# Patient Record
Sex: FEMALE | Race: WHITE | NOT HISPANIC OR LATINO | Employment: UNEMPLOYED | URBAN - METROPOLITAN AREA
[De-identification: names, ages, dates, MRNs, and addresses within clinical notes are randomized per-mention and may not be internally consistent; named-entity substitution may affect disease eponyms.]

---

## 2024-07-15 ENCOUNTER — TELEPHONE (OUTPATIENT)
Dept: OBSTETRICS AND GYNECOLOGY | Facility: CLINIC | Age: 24
End: 2024-07-15

## 2024-07-15 NOTE — TELEPHONE ENCOUNTER
----- Message from Taylor Ian sent at 7/12/2024  1:20 PM CDT -----  Type: NOB Confirmation     Who Called: Marianna Castillo    Symptoms :  NOB   Missed cycle: yes   Home tested: yes ( 2+)   Would the patient rather a call back or a response via [facility]? CB   Best call back number: [phone]    Additional Info: needs NOB confirmation appt please call

## 2024-07-15 NOTE — TELEPHONE ENCOUNTER
Spoke with patient and got her NOB information. Told her we would call her back to get her scheduled. Patient verbalized understanding.

## 2024-07-17 ENCOUNTER — PATIENT MESSAGE (OUTPATIENT)
Dept: OBSTETRICS AND GYNECOLOGY | Facility: CLINIC | Age: 24
End: 2024-07-17

## 2024-07-17 ENCOUNTER — TELEPHONE (OUTPATIENT)
Dept: OBSTETRICS AND GYNECOLOGY | Facility: CLINIC | Age: 24
End: 2024-07-17

## 2024-07-17 DIAGNOSIS — Z34.91 ENCOUNTER FOR PREGNANCY RELATED EXAMINATION IN FIRST TRIMESTER: Primary | ICD-10-CM

## 2024-07-17 NOTE — TELEPHONE ENCOUNTER
----- Message from Carlene Hyman sent at 7/17/2024  1:54 PM CDT -----  Contact: self  Type:  Patient Returning Call    Who Called:Marianna Castillo  Who Left Message for Patient:Ajay  Does the patient know what this is regarding?:US  Would the patient rather a call back or a response via [facility]? Call back  Best Call Back Number:[phone]  Additional Information: n/a

## 2024-08-13 ENCOUNTER — PROCEDURE VISIT (OUTPATIENT)
Dept: OBSTETRICS AND GYNECOLOGY | Facility: CLINIC | Age: 24
End: 2024-08-13
Payer: COMMERCIAL

## 2024-08-13 DIAGNOSIS — Z34.91 ENCOUNTER FOR PREGNANCY RELATED EXAMINATION IN FIRST TRIMESTER: ICD-10-CM

## 2024-08-13 DIAGNOSIS — Z34.91 ENCOUNTER FOR PREGNANCY RELATED EXAMINATION IN FIRST TRIMESTER: Primary | ICD-10-CM

## 2024-08-14 LAB
ABS NRBC COUNT: 0 X 10 3/UL (ref 0–0.01)
ABSOLUTE BASOPHIL: 0.01 X 10 3/UL (ref 0–0.22)
ABSOLUTE EOSINOPHIL: 0.05 X 10 3/UL (ref 0.04–0.54)
ABSOLUTE IMMATURE GRAN: 0.02 X 10 3/UL (ref 0–0.04)
ABSOLUTE LYMPHOCYTE: 1.9 X 10 3/UL (ref 0.86–4.75)
ABSOLUTE MONOCYTE: 0.44 X 10 3/UL (ref 0.22–1.08)
ANTIBODY SCREEN: NEGATIVE
BASOPHILS NFR BLD: 0.1 % (ref 0.2–1.2)
BLOOD GROUPING: NORMAL
BLOOD TYPE (D): POSITIVE
EOSINOPHIL NFR BLD: 0.6 % (ref 0.7–7)
HBV SURFACE AG SERPL QL IA: NONREACTIVE
HCT VFR BLD AUTO: 39.2 % (ref 37–47)
HCV IGG SERPL QL IA: NONREACTIVE
HGB BLD-MCNC: 13.4 G/DL (ref 12–16)
HIV 1+2 AB+HIV1 P24 AG SERPL QL IA: NONREACTIVE
IMMATURE GRANULOCYTES: 0.2 % (ref 0–0.5)
LYMPHOCYTES NFR BLD: 23.2 % (ref 19.3–53.1)
MCH RBC QN AUTO: 29.8 PG (ref 27–32)
MCHC RBC AUTO-ENTMCNC: 34.2 G/DL (ref 32–36)
MCV RBC AUTO: 87.1 FL (ref 82–100)
MONOCYTES NFR BLD: 5.4 % (ref 4.7–12.5)
NEUTROPHILS # BLD AUTO: 5.78 X 10 3/UL (ref 2.15–7.56)
NEUTROPHILS NFR BLD: 70.5 % (ref 34–71.1)
NUCLEATED RED BLOOD CELLS: 0 /100 WBC (ref 0–0.2)
PLATELET # BLD AUTO: 249 X 10 3/UL (ref 135–400)
RBC # BLD AUTO: 4.5 X 10 6/UL (ref 4.2–5.4)
RDW-SD: 38.3 FL (ref 37–54)
RUBELLA IGG SCREEN: POSITIVE
SICKLE CELL PREP: NEGATIVE
SYPHILIS TREPONEMAL ANTIBODY: NONREACTIVE
WBC # BLD: 8.2 X 10 3/UL (ref 4.3–10.8)

## 2024-08-27 ENCOUNTER — ROUTINE PRENATAL (OUTPATIENT)
Dept: OBSTETRICS AND GYNECOLOGY | Facility: CLINIC | Age: 24
End: 2024-08-27
Payer: COMMERCIAL

## 2024-08-27 VITALS — SYSTOLIC BLOOD PRESSURE: 121 MMHG | WEIGHT: 120.19 LBS | HEART RATE: 98 BPM | DIASTOLIC BLOOD PRESSURE: 65 MMHG

## 2024-08-27 DIAGNOSIS — Z34.91 ENCOUNTER FOR PREGNANCY RELATED EXAMINATION IN FIRST TRIMESTER: Primary | ICD-10-CM

## 2024-08-27 RX ORDER — ONDANSETRON 4 MG/1
4 TABLET, FILM COATED ORAL EVERY 8 HOURS
COMMUNITY
Start: 2024-08-15

## 2024-08-27 RX ORDER — PROMETHAZINE HYDROCHLORIDE 12.5 MG/1
12.5 TABLET ORAL 4 TIMES DAILY
Qty: 30 TABLET | Refills: 2 | Status: SHIPPED | OUTPATIENT
Start: 2024-08-27

## 2024-08-27 NOTE — PROGRESS NOTES
Subjective:       Patient ID: Marianna Castillo is a 24 y.o.  at 10w0d   Chief Complaint:  Initial Prenatal Visit      History of Present Illness  here for new ob exam.  Labs and history were reviewed with the patient today  No complaints      History reviewed. No pertinent past medical history.    Past Surgical History:   Procedure Laterality Date    FOOT SURGERY Right     x3       OB:    OB History    Para Term  AB Living   1             SAB IAB Ectopic Multiple Live Births                  # Outcome Date GA Lbr Carmelo/2nd Weight Sex Type Anes PTL Lv   1 Current              Gyn: no STD, never had abn pap   Meds:   Current Outpatient Medications:     ondansetron (ZOFRAN) 4 MG tablet, Take 4 mg by mouth every 8 (eight) hours., Disp: , Rfl:     promethazine (PHENERGAN) 12.5 MG Tab, Take 1 tablet (12.5 mg total) by mouth 4 (four) times daily., Disp: 30 tablet, Rfl: 2    All:   Review of patient's allergies indicates:   Allergen Reactions    Amoxicillin        SH:   Social History     Tobacco Use    Smoking status: Not on file    Smokeless tobacco: Not on file   Substance Use Topics    Alcohol use: Not on file      FH: family history is not on file.      Review of Systems  nml 1st trimester sx- sob, dec excercixe tolerance, fatigue and nausea  Neg for vag bleed, dc, vomiting, cp, lof, fever, chills, ns, visual changes, swelling, headaches, constipation/diarrhea, dysuria, freq/urgency of urination     Objective:     Vitals:    24 0904   BP: 121/65   Pulse: 98   Weight: 54.5 kg (120 lb 3.2 oz)       NAD  NCAT  pupils normal size  Skin nml no rashes or lesions  No resp distress, resp even and unlabored  nt nd, no rebound no guarding  nml ext fem gent, normal cvx uterus and adnexa, no dc or bleeding  nml appearing rectum  No cyanosis or clubbing, edema appropriate for pregn    Uterus size approp for gest age         Assessment:        1. Encounter for pregnancy related examination in first  trimester               Plan:      Encounter for pregnancy related examination in first trimester  -     Liquid-based pap smear, screening    Other orders  -     promethazine (PHENERGAN) 12.5 MG Tab; Take 1 tablet (12.5 mg total) by mouth 4 (four) times daily.  Dispense: 30 tablet; Refill: 2           Pain fever bleeding precautions  Encouraged PNV  rtc 4 wks

## 2024-08-29 LAB
CHLAMYDIA: NEGATIVE
GONORRHEA: NEGATIVE
SOURCE: NORMAL
SOURCE: NORMAL
TRICHOMONAS AMPLIFIED: NEGATIVE

## 2024-09-03 ENCOUNTER — PATIENT MESSAGE (OUTPATIENT)
Dept: OBSTETRICS AND GYNECOLOGY | Facility: CLINIC | Age: 24
End: 2024-09-03
Payer: COMMERCIAL

## 2024-09-27 ENCOUNTER — ROUTINE PRENATAL (OUTPATIENT)
Dept: OBSTETRICS AND GYNECOLOGY | Facility: CLINIC | Age: 24
End: 2024-09-27
Payer: COMMERCIAL

## 2024-09-27 VITALS — WEIGHT: 118 LBS | HEART RATE: 118 BPM | DIASTOLIC BLOOD PRESSURE: 76 MMHG | SYSTOLIC BLOOD PRESSURE: 114 MMHG

## 2024-09-27 DIAGNOSIS — Z34.91 ENCOUNTER FOR PREGNANCY RELATED EXAMINATION IN FIRST TRIMESTER: Primary | ICD-10-CM

## 2024-09-27 NOTE — PROGRESS NOTES
Subjective:       Patient ID: Marianna Castillo is a 24 y.o.  at 14w3d     Chief Complaint:  Routine Prenatal Visit      History of Present Illness  No complaints. Labs and history reviewed with pt.         Review of Systems  Denies n/v, f/c, dysuria, contractions,   VD, VB, round ligament pain, headaches       Objective:     Vitals:    24 0904   BP: 114/76   Pulse: (!) 118     Wt Readings from Last 3 Encounters:   24 53.5 kg (118 lb)   24 54.5 kg (120 lb 3.2 oz)      nad  NCAT  pupils normal size  Skin nml no rashes or lesions  No resp distress, resp even and unlabored   nt, nd,  no rebound no guarding  No cyanosis or clubbing, edema appropriate for pregn  FHT: 150s   Assessment:        1. Encounter for pregnancy related examination in first trimester                Plan:      Nipt neg, girl    Encouraged PNV  Pain, fever, bleeding precautions   RTC 4 weeks

## 2024-10-08 ENCOUNTER — PATIENT MESSAGE (OUTPATIENT)
Dept: OTHER | Facility: OTHER | Age: 24
End: 2024-10-08
Payer: COMMERCIAL

## 2024-10-15 ENCOUNTER — PATIENT MESSAGE (OUTPATIENT)
Dept: OTHER | Facility: OTHER | Age: 24
End: 2024-10-15
Payer: COMMERCIAL

## 2024-10-25 ENCOUNTER — ROUTINE PRENATAL (OUTPATIENT)
Dept: OBSTETRICS AND GYNECOLOGY | Facility: CLINIC | Age: 24
End: 2024-10-25
Payer: COMMERCIAL

## 2024-10-25 VITALS — WEIGHT: 123 LBS | DIASTOLIC BLOOD PRESSURE: 80 MMHG | HEART RATE: 79 BPM | SYSTOLIC BLOOD PRESSURE: 124 MMHG

## 2024-10-25 DIAGNOSIS — Z34.92 ENCOUNTER FOR PREGNANCY RELATED EXAMINATION IN SECOND TRIMESTER: Primary | ICD-10-CM

## 2024-10-25 LAB — GLUCOSE 1 HR POST 50 GM: 94 MG/DL

## 2024-10-25 NOTE — PROGRESS NOTES
Subjective:       Patient ID: Marianna Castillo is a 24 y.o.  at 14w3d     Chief Complaint:  Routine Prenatal Visit      History of Present Illness  C/o dizziness and feeling like she's going to pass out. Most commonly occurs 30-45 mins after eating.  Labs and history reviewed with pt.         Review of Systems  Denies n/v, f/c, dysuria, contractions,   VD, VB, round ligament pain, headaches       Objective:     Vitals:    10/25/24 1012   BP: 124/80   Pulse: 79     Wt Readings from Last 3 Encounters:   24 53.5 kg (118 lb)   24 54.5 kg (120 lb 3.2 oz)      nad  NCAT  pupils normal size  Skin nml no rashes or lesions  No resp distress, resp even and unlabored   nt, nd,  no rebound no guarding  No cyanosis or clubbing, edema appropriate for pregn  FHT: 150s   Assessment:        1. Encounter for pregnancy related examination in second trimester               Plan:      Nipt neg, girl    Early o'sul ordered   Encouragd home bps   Encouraged PNV  Pain, fever, bleeding precautions   RTC 4 weeks           Hide Additional Notes?: No Detail Level: Generalized Detail Level: Detailed

## 2024-10-28 ENCOUNTER — PATIENT MESSAGE (OUTPATIENT)
Dept: OBSTETRICS AND GYNECOLOGY | Facility: CLINIC | Age: 24
End: 2024-10-28
Payer: COMMERCIAL

## 2024-10-28 DIAGNOSIS — R00.0 TACHYCARDIA: ICD-10-CM

## 2024-10-28 DIAGNOSIS — O26.52 MATERNAL HYPOTENSION SYNDROME IN SECOND TRIMESTER: Primary | ICD-10-CM

## 2024-11-05 ENCOUNTER — PATIENT MESSAGE (OUTPATIENT)
Dept: OTHER | Facility: OTHER | Age: 24
End: 2024-11-05
Payer: COMMERCIAL

## 2024-11-13 DIAGNOSIS — Z34.93 ENCOUNTER FOR PREGNANCY RELATED EXAMINATION IN THIRD TRIMESTER: Primary | ICD-10-CM

## 2024-11-22 ENCOUNTER — ROUTINE PRENATAL (OUTPATIENT)
Dept: OBSTETRICS AND GYNECOLOGY | Facility: CLINIC | Age: 24
End: 2024-11-22
Payer: COMMERCIAL

## 2024-11-22 ENCOUNTER — PROCEDURE VISIT (OUTPATIENT)
Dept: OBSTETRICS AND GYNECOLOGY | Facility: CLINIC | Age: 24
End: 2024-11-22
Payer: COMMERCIAL

## 2024-11-22 VITALS — SYSTOLIC BLOOD PRESSURE: 111 MMHG | WEIGHT: 129.63 LBS | HEART RATE: 82 BPM | DIASTOLIC BLOOD PRESSURE: 78 MMHG

## 2024-11-22 DIAGNOSIS — Z34.93 ENCOUNTER FOR PREGNANCY RELATED EXAMINATION IN THIRD TRIMESTER: ICD-10-CM

## 2024-11-22 DIAGNOSIS — O26.52 MATERNAL HYPOTENSION SYNDROME IN SECOND TRIMESTER: ICD-10-CM

## 2024-11-22 DIAGNOSIS — Z34.93 ENCOUNTER FOR PREGNANCY RELATED EXAMINATION IN THIRD TRIMESTER: Primary | ICD-10-CM

## 2024-11-22 PROCEDURE — 76805 OB US >/= 14 WKS SNGL FETUS: CPT | Mod: 26,S$PBB,, | Performed by: OBSTETRICS & GYNECOLOGY

## 2024-11-22 NOTE — PROGRESS NOTES
Subjective:       Patient ID: Marianna Castillo is a 24 y.o.  at 22w3d      Chief Complaint:  Routine Prenatal Visit      History of Present Illness  C/o dizziness spells still happening. Following with cardiology.  Labs and history reviewed with pt.         Review of Systems  Denies n/v, f/c, dysuria, contractions,   VD, VB, round ligament pain, headaches       Objective:     Vitals:    24 0949   BP: 111/78   Pulse: 82     Wt Readings from Last 3 Encounters:   24 53.5 kg (118 lb)   24 54.5 kg (120 lb 3.2 oz)      nad  NCAT  pupils normal size  Skin nml no rashes or lesions  No resp distress, resp even and unlabored   nt, nd,  no rebound no guarding  No cyanosis or clubbing, edema appropriate for pregn  FHT: 150s   Assessment:        1. Encounter for pregnancy related examination in third trimester    2. Maternal hypotension syndrome in second trimester                 Plan:      Nipt neg, girl    Anatomy normal today   Encouragd home bps   Encouraged PNV  Pain, fever, bleeding precautions   RTC 4 weeks

## 2024-12-03 ENCOUNTER — PATIENT MESSAGE (OUTPATIENT)
Dept: OTHER | Facility: OTHER | Age: 24
End: 2024-12-03
Payer: COMMERCIAL

## 2024-12-17 ENCOUNTER — PATIENT MESSAGE (OUTPATIENT)
Dept: OTHER | Facility: OTHER | Age: 24
End: 2024-12-17
Payer: COMMERCIAL

## 2024-12-20 ENCOUNTER — ROUTINE PRENATAL (OUTPATIENT)
Dept: OBSTETRICS AND GYNECOLOGY | Facility: CLINIC | Age: 24
End: 2024-12-20
Payer: COMMERCIAL

## 2024-12-20 VITALS — WEIGHT: 135.63 LBS | DIASTOLIC BLOOD PRESSURE: 69 MMHG | HEART RATE: 91 BPM | SYSTOLIC BLOOD PRESSURE: 110 MMHG

## 2024-12-20 DIAGNOSIS — Z34.93 ENCOUNTER FOR PREGNANCY RELATED EXAMINATION IN THIRD TRIMESTER: Primary | ICD-10-CM

## 2024-12-20 NOTE — PROGRESS NOTES
Subjective:       Patient ID: Marianna Castillo is a 24 y.o.  at 26w3d       Chief Complaint:  Routine Prenatal Visit      History of Present Illness  C/o dizziness spells still happening. Following with cardiology.  Labs and history reviewed with pt.         Review of Systems  Denies n/v, f/c, dysuria, contractions,   VD, VB, round ligament pain, headaches       Objective:     Vitals:    24 1002   BP: 110/69   Pulse: 91     Wt Readings from Last 3 Encounters:   24 53.5 kg (118 lb)   24 54.5 kg (120 lb 3.2 oz)      nad  NCAT  pupils normal size  Skin nml no rashes or lesions  No resp distress, resp even and unlabored   nt, nd,  no rebound no guarding  No cyanosis or clubbing, edema appropriate for pregn  FHT: 150s   Assessment:        1. Encounter for pregnancy related examination in third trimester              Plan:      O'sul and tdap today   Encouragd home bps   Encouraged PNV  Pain, fever, bleeding precautions   RTC 4 weeks

## 2024-12-20 NOTE — ADDENDUM NOTE
Addended by: LAKESHIA DAY on: 12/20/2024 10:39 AM     Modules accepted: Orders     Normal vision: sees adequately in most situations; can see medication labels, newsprint

## 2024-12-26 ENCOUNTER — PATIENT MESSAGE (OUTPATIENT)
Dept: OBSTETRICS AND GYNECOLOGY | Facility: CLINIC | Age: 24
End: 2024-12-26
Payer: COMMERCIAL

## 2024-12-26 DIAGNOSIS — Z34.93 ENCOUNTER FOR PREGNANCY RELATED EXAMINATION IN THIRD TRIMESTER: Primary | ICD-10-CM

## 2024-12-27 LAB
GLUCOSE, 1 HOUR: 141 MG/DL (ref 70–99)
GLUCOSE, 2 HOUR: 94 MG/DL (ref 54–179)
GLUCOSE, 3 HOUR: 109 MG/DL (ref 54–139)
GLUCOSE, FASTING: 81 MG/DL (ref 74–109)

## 2025-01-10 ENCOUNTER — ROUTINE PRENATAL (OUTPATIENT)
Dept: OBSTETRICS AND GYNECOLOGY | Facility: CLINIC | Age: 25
End: 2025-01-10
Payer: COMMERCIAL

## 2025-01-10 VITALS — DIASTOLIC BLOOD PRESSURE: 72 MMHG | HEART RATE: 90 BPM | WEIGHT: 139.63 LBS | SYSTOLIC BLOOD PRESSURE: 116 MMHG

## 2025-01-10 DIAGNOSIS — Z34.93 ENCOUNTER FOR PREGNANCY RELATED EXAMINATION IN THIRD TRIMESTER: Primary | ICD-10-CM

## 2025-01-10 LAB
ABS NRBC COUNT: 0 X 10 3/UL (ref 0–0.01)
ABSOLUTE BASOPHIL: 0.04 X 10 3/UL (ref 0–0.22)
ABSOLUTE EOSINOPHIL: 0.04 X 10 3/UL (ref 0.04–0.54)
ABSOLUTE IMMATURE GRAN: 0.23 X 10 3/UL (ref 0–0.04)
ABSOLUTE LYMPHOCYTE: 2.34 X 10 3/UL (ref 0.86–4.75)
ABSOLUTE MONOCYTE: 0.53 X 10 3/UL (ref 0.22–1.08)
BASOPHILS NFR BLD: 0.3 % (ref 0.2–1.2)
EOSINOPHIL NFR BLD: 0.3 % (ref 0.7–7)
HCT VFR BLD AUTO: 36.9 % (ref 37–47)
HGB BLD-MCNC: 12.2 G/DL (ref 12–16)
IMMATURE GRANULOCYTES: 2 % (ref 0–0.5)
LYMPHOCYTES NFR BLD: 20.1 % (ref 19.3–53.1)
MCH RBC QN AUTO: 30.3 PG (ref 27–32)
MCHC RBC AUTO-ENTMCNC: 33.1 G/DL (ref 32–36)
MCV RBC AUTO: 91.8 FL (ref 82–100)
MONOCYTES NFR BLD: 4.6 % (ref 4.7–12.5)
NEUTROPHILS # BLD AUTO: 8.45 X 10 3/UL (ref 2.15–7.56)
NEUTROPHILS NFR BLD: 72.7 % (ref 34–71.1)
NUCLEATED RED BLOOD CELLS: 0 /100 WBC (ref 0–0.2)
PLATELET # BLD AUTO: 184 X 10 3/UL (ref 135–400)
RBC # BLD AUTO: 4.02 X 10 6/UL (ref 4.2–5.4)
RDW-SD: 41.1 FL (ref 37–54)
WBC # BLD: 11.63 X 10 3/UL (ref 4.3–10.8)

## 2025-01-10 NOTE — PROGRESS NOTES
Subjective:       Patient ID: Marianna Castillo is a 24 y.o.  at 29w3d        Chief Complaint:  Routine Prenatal Visit      History of Present Illness  No complaints.  Labs and history reviewed with pt.         Review of Systems  Denies n/v, f/c, dysuria, contractions,   VD, VB, round ligament pain, headaches       Objective:     Vitals:    01/10/25 1051   BP: 116/72   Pulse: 90     Wt Readings from Last 3 Encounters:   24 53.5 kg (118 lb)   24 54.5 kg (120 lb 3.2 oz)      nad  NCAT  pupils normal size  Skin nml no rashes or lesions  No resp distress, resp even and unlabored   nt, nd,  no rebound no guarding  No cyanosis or clubbing, edema appropriate for pregn  FHT: 150s   Assessment:        1. Encounter for pregnancy related examination in third trimester              Plan:      Cbc ordered   Encouragd home bps   Encouraged PNV  Pain, fever, bleeding precautions   RTC 4 weeks

## 2025-01-14 ENCOUNTER — PATIENT MESSAGE (OUTPATIENT)
Dept: OTHER | Facility: OTHER | Age: 25
End: 2025-01-14
Payer: COMMERCIAL

## 2025-01-17 ENCOUNTER — PATIENT MESSAGE (OUTPATIENT)
Dept: OBSTETRICS AND GYNECOLOGY | Facility: CLINIC | Age: 25
End: 2025-01-17
Payer: COMMERCIAL

## 2025-01-27 DIAGNOSIS — Z34.93 ENCOUNTER FOR PREGNANCY RELATED EXAMINATION IN THIRD TRIMESTER: Primary | ICD-10-CM

## 2025-01-28 ENCOUNTER — PATIENT MESSAGE (OUTPATIENT)
Dept: OTHER | Facility: OTHER | Age: 25
End: 2025-01-28
Payer: COMMERCIAL

## 2025-01-31 ENCOUNTER — ROUTINE PRENATAL (OUTPATIENT)
Dept: OBSTETRICS AND GYNECOLOGY | Facility: CLINIC | Age: 25
End: 2025-01-31
Payer: COMMERCIAL

## 2025-01-31 ENCOUNTER — PROCEDURE VISIT (OUTPATIENT)
Dept: OBSTETRICS AND GYNECOLOGY | Facility: CLINIC | Age: 25
End: 2025-01-31
Payer: COMMERCIAL

## 2025-01-31 VITALS — DIASTOLIC BLOOD PRESSURE: 78 MMHG | HEART RATE: 97 BPM | SYSTOLIC BLOOD PRESSURE: 117 MMHG | WEIGHT: 141.38 LBS

## 2025-01-31 DIAGNOSIS — O26.893 HEARTBURN DURING PREGNANCY IN THIRD TRIMESTER: Primary | ICD-10-CM

## 2025-01-31 DIAGNOSIS — R12 HEARTBURN DURING PREGNANCY IN THIRD TRIMESTER: Primary | ICD-10-CM

## 2025-01-31 DIAGNOSIS — Z34.93 ENCOUNTER FOR PREGNANCY RELATED EXAMINATION IN THIRD TRIMESTER: ICD-10-CM

## 2025-01-31 PROCEDURE — 76816 OB US FOLLOW-UP PER FETUS: CPT | Mod: 26,S$PBB,, | Performed by: OBSTETRICS & GYNECOLOGY

## 2025-01-31 PROCEDURE — 0502F SUBSEQUENT PRENATAL CARE: CPT | Mod: CPTII,,, | Performed by: OBSTETRICS & GYNECOLOGY

## 2025-01-31 RX ORDER — SUCRALFATE 1 G/1
1 TABLET ORAL 4 TIMES DAILY
Qty: 120 TABLET | Refills: 5 | Status: SHIPPED | OUTPATIENT
Start: 2025-01-31 | End: 2026-01-31

## 2025-01-31 NOTE — PROGRESS NOTES
Subjective:       Patient ID: Marianna Castillo is a 24 y.o.  at 29w3d        Chief Complaint:  Routine Prenatal Visit      History of Present Illness  C/o heart burn and pain of her right ribs. Not associated with eating but pain is not constant. Noted to have baby feet there on US per pt.  Labs and history reviewed with pt.         Review of Systems  Denies n/v, f/c, dysuria, contractions,   VD, VB, round ligament pain, headaches       Objective:     Vitals:    25 1017   BP: 117/78   Pulse: 97     Wt Readings from Last 3 Encounters:   24 53.5 kg (118 lb)   24 54.5 kg (120 lb 3.2 oz)      nad  NCAT  pupils normal size  Skin nml no rashes or lesions  No resp distress, resp even and unlabored   nt, nd,  no rebound no guarding  No cyanosis or clubbing, edema appropriate for pregn  FHT: 150s   Assessment:        1. Heartburn during pregnancy in third trimester    2. Encounter for pregnancy related examination in third trimester              Plan:      Cbc ordered   Carafate rx   Encouraged PNV  Pain, fever, bleeding precautions   RTC 2 weeks

## 2025-02-14 ENCOUNTER — ROUTINE PRENATAL (OUTPATIENT)
Dept: OBSTETRICS AND GYNECOLOGY | Facility: CLINIC | Age: 25
End: 2025-02-14
Payer: COMMERCIAL

## 2025-02-14 VITALS — SYSTOLIC BLOOD PRESSURE: 104 MMHG | HEART RATE: 85 BPM | WEIGHT: 146 LBS | DIASTOLIC BLOOD PRESSURE: 72 MMHG

## 2025-02-14 DIAGNOSIS — O26.893 HEARTBURN DURING PREGNANCY IN THIRD TRIMESTER: Primary | ICD-10-CM

## 2025-02-14 DIAGNOSIS — R12 HEARTBURN DURING PREGNANCY IN THIRD TRIMESTER: Primary | ICD-10-CM

## 2025-02-14 DIAGNOSIS — Z34.93 ENCOUNTER FOR PREGNANCY RELATED EXAMINATION IN THIRD TRIMESTER: ICD-10-CM

## 2025-02-14 RX ORDER — PANTOPRAZOLE SODIUM 20 MG/1
20 TABLET, DELAYED RELEASE ORAL DAILY
Qty: 30 TABLET | Refills: 11 | Status: SHIPPED | OUTPATIENT
Start: 2025-02-14 | End: 2026-02-14

## 2025-02-14 NOTE — PROGRESS NOTES
Subjective:       Patient ID: Marianna Castillo is a 24 y.o.  at 34w3d          Chief Complaint:  Routine Prenatal Visit      History of Present Illness  C/o heart burn and pain of her right ribs. Not associated with eating but pain is not constant. Noted to have baby feet there on US per pt.  Labs and history reviewed with pt.         Review of Systems  Denies n/v, f/c, dysuria, contractions,   VD, VB, round ligament pain, headaches       Objective:     Vitals:    25 1110   BP: 104/72   Pulse: 85     Wt Readings from Last 3 Encounters:   24 53.5 kg (118 lb)   24 54.5 kg (120 lb 3.2 oz)      nad  NCAT  pupils normal size  Skin nml no rashes or lesions  No resp distress, resp even and unlabored   nt, nd,  no rebound no guarding  No cyanosis or clubbing, edema appropriate for pregn  FHT: 150s   Assessment:        1. Heartburn during pregnancy in third trimester    2. Encounter for pregnancy related examination in third trimester                Plan:      Cbc ordered   Protonix rx   Encouraged PNV  Pain, fever, bleeding precautions   RTC 2 weeks

## 2025-02-18 ENCOUNTER — PATIENT MESSAGE (OUTPATIENT)
Dept: OTHER | Facility: OTHER | Age: 25
End: 2025-02-18
Payer: COMMERCIAL

## 2025-02-21 ENCOUNTER — ROUTINE PRENATAL (OUTPATIENT)
Dept: OBSTETRICS AND GYNECOLOGY | Facility: CLINIC | Age: 25
End: 2025-02-21
Payer: COMMERCIAL

## 2025-02-21 VITALS — WEIGHT: 147 LBS | HEART RATE: 121 BPM | DIASTOLIC BLOOD PRESSURE: 80 MMHG | SYSTOLIC BLOOD PRESSURE: 112 MMHG

## 2025-02-21 DIAGNOSIS — Z34.93 ENCOUNTER FOR PREGNANCY RELATED EXAMINATION IN THIRD TRIMESTER: Primary | ICD-10-CM

## 2025-02-21 NOTE — PROGRESS NOTES
Subjective:       Patient ID: Marianna Castillo is a 24 y.o.  at 35w3d           Chief Complaint:  Routine Prenatal Visit      History of Present Illness  C/o heart burn and pain of her right ribs. Not associated with eating but pain is not constant. Noted to have baby feet there on US per pt.  Labs and history reviewed with pt.         Review of Systems  Denies n/v, f/c, dysuria, contractions,   VD, VB, round ligament pain, headaches       Objective:     Vitals:    25 1054   BP: 112/80   Pulse: (!) 121     Wt Readings from Last 3 Encounters:   24 53.5 kg (118 lb)   24 54.5 kg (120 lb 3.2 oz)      nad  NCAT  pupils normal size  Skin nml no rashes or lesions  No resp distress, resp even and unlabored   nt, nd,  no rebound no guarding  No cyanosis or clubbing, edema appropriate for pregn  FHT: 150s   Assessment:        1. Encounter for pregnancy related examination in third trimester                  Plan:         Encouraged PNV  Pain, fever, bleeding precautions   RTC 1 weeks

## 2025-02-28 ENCOUNTER — ROUTINE PRENATAL (OUTPATIENT)
Dept: OBSTETRICS AND GYNECOLOGY | Facility: CLINIC | Age: 25
End: 2025-02-28
Payer: COMMERCIAL

## 2025-02-28 VITALS — HEART RATE: 103 BPM | DIASTOLIC BLOOD PRESSURE: 81 MMHG | WEIGHT: 151 LBS | SYSTOLIC BLOOD PRESSURE: 130 MMHG

## 2025-02-28 DIAGNOSIS — Z34.93 ENCOUNTER FOR PREGNANCY RELATED EXAMINATION IN THIRD TRIMESTER: Primary | ICD-10-CM

## 2025-02-28 NOTE — PROGRESS NOTES
Subjective:       Patient ID: Marianna Castillo is a 24 y.o.  at 36w3d            Chief Complaint:  Routine Prenatal Visit      History of Present Illness  No complaints.  Labs and history reviewed with pt.         Review of Systems  Denies n/v, f/c, dysuria, contractions,   VD, VB, round ligament pain, headaches       Objective:     Vitals:    25 1126   BP: 130/81   Pulse: 103     Wt Readings from Last 3 Encounters:   24 53.5 kg (118 lb)   24 54.5 kg (120 lb 3.2 oz)      nad  NCAT  pupils normal size  Skin nml no rashes or lesions  No resp distress, resp even and unlabored   nt, nd,  no rebound no guarding  No cyanosis or clubbing, edema appropriate for pregn  FHT: 150s   Assessment:        1. Encounter for pregnancy related examination in third trimester              Plan:      Gbs today    Encouraged PNV  Pain, fever, bleeding precautions   RTC 1 weeks